# Patient Record
Sex: MALE | URBAN - METROPOLITAN AREA
[De-identification: names, ages, dates, MRNs, and addresses within clinical notes are randomized per-mention and may not be internally consistent; named-entity substitution may affect disease eponyms.]

---

## 2022-05-05 ENCOUNTER — APPOINTMENT (RX ONLY)
Dept: URBAN - METROPOLITAN AREA CLINIC 114 | Facility: CLINIC | Age: 27
Setting detail: DERMATOLOGY
End: 2022-05-05

## 2022-05-05 DIAGNOSIS — L72.8 OTHER FOLLICULAR CYSTS OF THE SKIN AND SUBCUTANEOUS TISSUE: ICD-10-CM

## 2022-05-05 PROCEDURE — ? INTRALESIONAL KENALOG

## 2022-05-05 PROCEDURE — 11900 INJECT SKIN LESIONS </W 7: CPT

## 2022-05-05 PROCEDURE — ? PRESCRIPTION

## 2022-05-05 PROCEDURE — ? COUNSELING

## 2022-05-05 RX ORDER — DOXYCYCLINE HYCLATE 100 MG/1
TABLET, COATED ORAL
Qty: 28 | Refills: 0 | Status: ERX | COMMUNITY
Start: 2022-05-05

## 2022-05-05 RX ADMIN — DOXYCYCLINE HYCLATE: 100 TABLET, COATED ORAL at 00:00

## 2022-05-05 ASSESSMENT — LOCATION DETAILED DESCRIPTION DERM: LOCATION DETAILED: RIGHT POSTERIOR AXILLA

## 2022-05-05 ASSESSMENT — LOCATION SIMPLE DESCRIPTION DERM: LOCATION SIMPLE: RIGHT POSTERIOR AXILLA

## 2022-05-05 ASSESSMENT — LOCATION ZONE DERM: LOCATION ZONE: AXILLAE

## 2022-05-05 NOTE — PROCEDURE: INTRALESIONAL KENALOG
Validate Note Data When Using Inventory: Yes
Ndc# For Kenalog Only: 4588-0729-54
Kenalog Preparation: Kenalog
Include Z78.9 (Other Specified Conditions Influencing Health Status) As An Associated Diagnosis?: No
Concentration Of Solution Injected (Mg/Ml): 10.0
Total Volume Injected (Ccs- Only Use Numbers And Decimals): 0.2
Consent: Verbal consent was obtained from the patient and the risks of cyst recurrence and skin atrophy were also reviewed with the patient.
Expiration Date (Optional): 04/2023
X Size Of Lesion In Cm (Optional): 0
Detail Level: Detailed
Medical Necessity Clause: This procedure was medically necessary because the lesions that were treated were:
Lot # (Optional): [id number]

## 2022-05-18 ENCOUNTER — APPOINTMENT (RX ONLY)
Dept: URBAN - METROPOLITAN AREA CLINIC 114 | Facility: CLINIC | Age: 27
Setting detail: DERMATOLOGY
End: 2022-05-18

## 2022-05-18 DIAGNOSIS — L72.8 OTHER FOLLICULAR CYSTS OF THE SKIN AND SUBCUTANEOUS TISSUE: ICD-10-CM

## 2022-05-18 PROCEDURE — ? INCISION AND DRAINAGE

## 2022-05-18 PROCEDURE — 10060 I&D ABSCESS SIMPLE/SINGLE: CPT

## 2022-05-18 PROCEDURE — ? BIOPSY BY PUNCH METHOD

## 2022-05-18 PROCEDURE — 11104 PUNCH BX SKIN SINGLE LESION: CPT

## 2022-05-18 PROCEDURE — ? PRESCRIPTION

## 2022-05-18 PROCEDURE — ? ORDER TESTS

## 2022-05-18 RX ORDER — MUPIROCIN 20 MG/G
OINTMENT TOPICAL
Qty: 22 | Refills: 1 | Status: ERX | COMMUNITY
Start: 2022-05-18

## 2022-05-18 RX ORDER — DOXYCYCLINE HYCLATE 100 MG/1
TABLET, COATED ORAL
Qty: 30 | Refills: 0 | Status: ERX

## 2022-05-18 RX ADMIN — MUPIROCIN: 20 OINTMENT TOPICAL at 00:00

## 2022-05-18 ASSESSMENT — LOCATION SIMPLE DESCRIPTION DERM: LOCATION SIMPLE: RIGHT AXILLARY VAULT

## 2022-05-18 ASSESSMENT — LOCATION DETAILED DESCRIPTION DERM: LOCATION DETAILED: RIGHT AXILLARY VAULT

## 2022-05-18 ASSESSMENT — LOCATION ZONE DERM: LOCATION ZONE: AXILLAE

## 2022-05-18 NOTE — PROCEDURE: INCISION AND DRAINAGE
Detail Level: Detailed
Lesion Type: Abscess
Method: 4 mm punch
Curette: Yes
Include Sutures?: No
Anesthesia Type: 1% lidocaine with epinephrine and a 1:10 solution of 8.4% sodium bicarbonate
Anesthesia Volume In Cc: 3
Size Of Lesion In Cm (Optional But May Be Required For Some Insurances): 5
Drainage Amount?: significant
Drainage Type?: purulent  and cyst-like
Dressing: pressure dressing
Hemostasis: Aluminum Chloride
Epidermal Sutures: 4-0 Ethilon
Epidermal Closure: simple interrupted
Suture Text: The incision was partially closed with
Preparation Text: The area was prepped in the usual clean fashion.
Curette Text (Optional): After the contents were expressed a curette was used to partially remove the cyst wall.
Consent was obtained and risks were reviewed including but not limited to delayed wound healing, infection, need for multiple I and D's and/or complete surgical excision, pain.
Post-Care Instructions: I reviewed with the patient in detail post-care instructions. Patient should keep wound covered and call the office should any redness, pain, swelling or worsening occur.

## 2022-05-18 NOTE — PROCEDURE: BIOPSY BY PUNCH METHOD
Detail Level: Detailed
Was A Bandage Applied: Yes
Punch Size In Mm: 4
Biopsy Type: H and E
Anesthesia Type: 1% lidocaine with epinephrine and a 1:10 solution of 8.4% sodium bicarbonate
Anesthesia Volume In Cc (Will Not Render If 0): 0.5
Additional Anesthesia Volume In Cc (Will Not Render If 0): 0
Hemostasis: Aluminum Chloride
Epidermal Sutures: none, closed by secondary intention
Wound Care: No ointment
Dressing: pressure dressing
Suture Removal: 14 days
Patient Will Remove Sutures At Home?: No
Lab: [id number]
Lab Facility: [id number]
Consent: Verbal and written consent was obtained and risks were reviewed including but not limited to scarring, infection, bleeding, scabbing, incomplete removal, nerve damage and allergy to anesthesia.
Post-Care Instructions: I reviewed with the patient in detail post-care instructions.
Home Suture Removal Text: Patient will remove their sutures at home.  If they have any questions or difficulties they will call the office.
Notification Instructions: Patient will be notified of biopsy results in 1-2 weeks. However, patient instructed to call the office if not contacted within 15 days from now.
Billing Type: Third-Party Bill
Information: Selecting Yes will display possible errors in your note based on the variables you have selected. This validation is only offered as a suggestion for you. PLEASE NOTE THAT THE VALIDATION TEXT WILL BE REMOVED WHEN YOU FINALIZE YOUR NOTE. IF YOU WANT TO FAX A PRELIMINARY NOTE YOU WILL NEED TO TOGGLE THIS TO 'NO' IF YOU DO NOT WANT IT IN YOUR FAXED NOTE.

## 2022-05-18 NOTE — PROCEDURE: ORDER TESTS
Billing Type: Third-Party Bill
Performing Laboratory: [id number]
Bill For Surgical Tray: no
Expected Date Of Service: 05/18/2022
Lab Facility: [id number]

## 2022-05-27 ENCOUNTER — APPOINTMENT (RX ONLY)
Dept: URBAN - METROPOLITAN AREA CLINIC 114 | Facility: CLINIC | Age: 27
Setting detail: DERMATOLOGY
End: 2022-05-27

## 2022-05-27 DIAGNOSIS — L72.8 OTHER FOLLICULAR CYSTS OF THE SKIN AND SUBCUTANEOUS TISSUE: ICD-10-CM

## 2022-05-27 PROCEDURE — ? DIAGNOSIS COMMENT

## 2022-05-27 PROCEDURE — ? PRESCRIPTION

## 2022-05-27 PROCEDURE — ? COUNSELING

## 2022-05-27 RX ORDER — AMOXICILLIN AND CLAVULANATE POTASSIUM 875; 125 MG/1; MG/1
TABLET, FILM COATED ORAL
Qty: 20 | Refills: 0 | Status: ERX | COMMUNITY
Start: 2022-05-27

## 2022-05-27 RX ADMIN — AMOXICILLIN AND CLAVULANATE POTASSIUM: 875; 125 TABLET, FILM COATED ORAL at 00:00

## 2022-05-27 ASSESSMENT — LOCATION ZONE DERM: LOCATION ZONE: AXILLAE

## 2022-05-27 ASSESSMENT — LOCATION DETAILED DESCRIPTION DERM: LOCATION DETAILED: RIGHT AXILLARY VAULT

## 2022-05-27 ASSESSMENT — LOCATION SIMPLE DESCRIPTION DERM: LOCATION SIMPLE: RIGHT AXILLARY VAULT

## 2022-05-27 NOTE — PROCEDURE: DIAGNOSIS COMMENT
Detail Level: Simple
Render Risk Assessment In Note?: yes
Comment: Reviewed labs with patient in office today